# Patient Record
Sex: FEMALE | Race: WHITE
[De-identification: names, ages, dates, MRNs, and addresses within clinical notes are randomized per-mention and may not be internally consistent; named-entity substitution may affect disease eponyms.]

---

## 2019-01-30 ENCOUNTER — HOSPITAL ENCOUNTER (OUTPATIENT)
Dept: HOSPITAL 74 - JER | Age: 54
Setting detail: OBSERVATION
LOS: 2 days | Discharge: HOME | End: 2019-02-01
Attending: INTERNAL MEDICINE | Admitting: INTERNAL MEDICINE
Payer: COMMERCIAL

## 2019-01-30 VITALS — BODY MASS INDEX: 40.7 KG/M2

## 2019-01-30 DIAGNOSIS — R74.8: ICD-10-CM

## 2019-01-30 DIAGNOSIS — E66.01: ICD-10-CM

## 2019-01-30 DIAGNOSIS — R10.84: ICD-10-CM

## 2019-01-30 DIAGNOSIS — R10.12: ICD-10-CM

## 2019-01-30 DIAGNOSIS — I10: ICD-10-CM

## 2019-01-30 DIAGNOSIS — R10.13: ICD-10-CM

## 2019-01-30 DIAGNOSIS — K80.20: Primary | ICD-10-CM

## 2019-01-30 DIAGNOSIS — Z98.84: ICD-10-CM

## 2019-01-30 DIAGNOSIS — E78.5: ICD-10-CM

## 2019-01-30 LAB
ALBUMIN SERPL-MCNC: 3.8 G/DL (ref 3.4–5)
ALP SERPL-CCNC: 92 U/L (ref 45–117)
ALT SERPL-CCNC: 24 U/L (ref 13–61)
ANION GAP SERPL CALC-SCNC: 8 MMOL/L (ref 8–16)
APPEARANCE UR: (no result)
AST SERPL-CCNC: 24 U/L (ref 15–37)
BACTERIA #/AREA URNS HPF: (no result) /HPF
BASOPHILS # BLD: 1.1 % (ref 0–2)
BILIRUB SERPL-MCNC: 0.2 MG/DL (ref 0.2–1)
BILIRUB UR STRIP.AUTO-MCNC: NEGATIVE MG/DL
BUN SERPL-MCNC: 25 MG/DL (ref 7–18)
CALCIUM SERPL-MCNC: 8.9 MG/DL (ref 8.5–10.1)
CHLORIDE SERPL-SCNC: 106 MMOL/L (ref 98–107)
CHOLEST SERPL-MCNC: 242 MG/DL (ref 50–200)
CO2 SERPL-SCNC: 25 MMOL/L (ref 21–32)
COLOR UR: (no result)
CREAT SERPL-MCNC: 0.8 MG/DL (ref 0.55–1.3)
DEPRECATED RDW RBC AUTO: 13.9 % (ref 11.6–15.6)
EOSINOPHIL # BLD: 2.4 % (ref 0–4.5)
EPITH CASTS URNS QL MICRO: (no result) /HPF
GLUCOSE SERPL-MCNC: 80 MG/DL (ref 74–106)
HCT VFR BLD CALC: 33.8 % (ref 32.4–45.2)
HDLC SERPL-MCNC: 51 MG/DL (ref 40–60)
HGB BLD-MCNC: 11.3 GM/DL (ref 10.7–15.3)
KETONES UR QL STRIP: NEGATIVE
LEUKOCYTE ESTERASE UR QL STRIP.AUTO: (no result)
LIPASE SERPL-CCNC: 550 U/L (ref 73–393)
LYMPHOCYTES # BLD: 27.6 % (ref 8–40)
MCH RBC QN AUTO: 27 PG (ref 25.7–33.7)
MCHC RBC AUTO-ENTMCNC: 33.5 G/DL (ref 32–36)
MCV RBC: 80.4 FL (ref 80–96)
MONOCYTES # BLD AUTO: 7.4 % (ref 3.8–10.2)
MUCOUS THREADS URNS QL MICRO: (no result)
NEUTROPHILS # BLD: 61.5 % (ref 42.8–82.8)
NITRITE UR QL STRIP: NEGATIVE
PH UR: 6 [PH] (ref 5–8)
PLATELET # BLD AUTO: 292 K/MM3 (ref 134–434)
PMV BLD: 8.6 FL (ref 7.5–11.1)
POTASSIUM SERPLBLD-SCNC: 4.3 MMOL/L (ref 3.5–5.1)
PROT SERPL-MCNC: 7.9 G/DL (ref 6.4–8.2)
PROT UR QL STRIP: NEGATIVE
PROT UR QL STRIP: NEGATIVE
RBC # BLD AUTO: 4.2 M/MM3 (ref 3.6–5.2)
SODIUM SERPL-SCNC: 140 MMOL/L (ref 136–145)
SP GR UR: 1.03 (ref 1.01–1.03)
TRIGL SERPL-MCNC: 134 MG/DL (ref 0–150)
UROBILINOGEN UR STRIP-MCNC: NEGATIVE MG/DL (ref 0.2–1)
WBC # BLD AUTO: 9.4 K/MM3 (ref 4–10)

## 2019-01-30 PROCEDURE — 0DB68ZX EXCISION OF STOMACH, VIA NATURAL OR ARTIFICIAL OPENING ENDOSCOPIC, DIAGNOSTIC: ICD-10-PCS | Performed by: INTERNAL MEDICINE

## 2019-01-30 PROCEDURE — 3E0337Z INTRODUCTION OF ELECTROLYTIC AND WATER BALANCE SUBSTANCE INTO PERIPHERAL VEIN, PERCUTANEOUS APPROACH: ICD-10-PCS | Performed by: INTERNAL MEDICINE

## 2019-01-30 PROCEDURE — 3E013GC INTRODUCTION OF OTHER THERAPEUTIC SUBSTANCE INTO SUBCUTANEOUS TISSUE, PERCUTANEOUS APPROACH: ICD-10-PCS | Performed by: INTERNAL MEDICINE

## 2019-01-30 PROCEDURE — 3E033GC INTRODUCTION OF OTHER THERAPEUTIC SUBSTANCE INTO PERIPHERAL VEIN, PERCUTANEOUS APPROACH: ICD-10-PCS | Performed by: INTERNAL MEDICINE

## 2019-01-30 PROCEDURE — G0378 HOSPITAL OBSERVATION PER HR: HCPCS

## 2019-01-30 NOTE — PN
Teaching Attending Note


Name of Resident: Allyson Worthington





ATTENDING PHYSICIAN STATEMENT





I saw and evaluated the patient.


I reviewed the resident's note and discussed the case with the resident.


I agree with the resident's findings and plan as documented.








SUBJECTIVE:


Patient is a 53 year old woman with PMH of HTN, HLD, bariatric surgery about 15 

years ago who presents with sharp abdominal pain for 1 month. Pain radiates 

from the left costal margin to the left inguinal and suprapubic regions, 

relapsing and remitting with 8/10 severity at worst. No obvious exacerbating or 

alleviating factors. Recently developed nausea and dizzy spells. Reports recent 

use of meloxicam multiple times per week for several weeks for knee pain, and 

combining meloxicam with ibuprofen for particularly bad pain. She has been 

evaluated for these symptoms at urgent care, by her PMD, and by her GI. Reports 

undergoing US at urgent care but not knowing the result. Reports GI recommended 

CT scan but she has not followed up at this time. Endorses left-sided chest 

pain and SOB since onset of symptoms but is not currently experiencing these 

symptoms. No vomiting, diarrhea, chills, headache or dysuria. 





OBJECTIVE:


Alert


 Vital Signs











 Period  Temp  Pulse  Resp  BP Sys/Flynn  Pulse Ox


 


 Last 24 Hr  98.2 F  64  16  167/88  99








HEENT: No Jaundice, eye redness or discharge, PERRLA, EOMI. Normocephalic, 

atraumatic. External ears are normal and hearing is grossly intact. No nasal 

discharge.


Neck: Supple, nontender. No palpable adenopathy or thyromegaly. No JVD


Chest: Good effort. Clear to auscultation and percussion.


Heart: Regular. No S3, rub or murmur


Abdomen: Not distended, soft, tender left side of abdomen and suprapubic area; 

no HSM. No rebound or guarding.  Normoactive bowel sounds.


Ext: Peripheral pulses intact. No leg edema.


Skin: Warm and dry. No petechiae, rash or ecchymosis.


Neuro: Alert. Oriented x3. CN 2-12 grossly intact. Sensation grossly intact in 

all four extremities and DTR are symmetric.


 Current Medications











Generic Name Dose Route Start Last Admin





  Trade Name Freq  PRN Reason Stop Dose Admin


 


Lactated Ringer's  1,000 ml in 1,000 mls @ 100 mls/hr  01/30/19 21:00  01/30/19 

21:46





  Lactated Ringers Solution  IV   100 mls/hr





  ASDIR PROSPER   Administration





     





     





     





     








 Home Medications











 Medication  Instructions  Recorded


 


Nebivolol [Bystolic -] 5 mg PO DAILY 09/01/16








 Abnormal Lab Results











  01/30/19 01/30/19 01/30/19





  16:38 17:09 18:44


 


BUN  25 H  


 


Cholesterol    242 H


 


Total LDL Cholesterol    164 H


 


Lipase  550 H  


 


Ur Leukocyte Esterase   1+ H 








ASSESSMENT AND PLAN:


1. Abdominal pain - May be due to gall stones noted on CT scan. Will repeat UA 

to rule out UTI. Trend lipase and get MRCP after consultation with GI. Continue 

IV fluids, keep her NPO and do pain control PRN. Surgery consulted. Needs 

dietary and intensified drug treatment for hyperlipidemia.





2. Obesity - Will provide patient all the necessary assistance, counseling and 

positive reinforcement to facilitate weight loss. Consult dietician.





3. Uncontrolled Hypertension - Will restart her home antihypertensive drugs and 

also craft a better regimen for her to ultimately attain normotension.  

Nonpharmacologic measures to control hypertension like weight loss, salt 

restriction and exercise discussed.





4. DVT prophylaxis - Lovenox 40 mg SQ q 24 hours.





5. Advance directives - Full code

## 2019-01-30 NOTE — HP
CHIEF COMPLAINT: epigastric abdominal pain





PCP: Dr. Cowart





HISTORY OF PRESENT ILLNESS:


52 y/o female with PMH of HTN, HLD, presents with a one month history of 

epigastric/left sided abdominal pain. She states that the pain stated on new 

years tfif, it was no associated with any nausea/vomiting/diarrhea nor was it 

worse after certain foods. She saw her gastroenterologist Dr. Jackson who 

started her on Hyoscyamine .375 daily and told her to let him know how she was 

feeling. She was still having pain so she went to an urgent care who di an 

ultrasound which showed gallstones and they did labs which showed a lipase of 

550 and she began feeling nauseous today so she came in.  of note, she did have 

a stomach virus 2 weeks ago whihc lasted 2 weeks. she denies any recent travel 

nor has any sick contacts at home.





ER course was notable for:


(1) vital wnl


(2) cbc/cmp wnl; lipase 550


(3) ab/pelvis CT showing cholelithiasis no cholecystitis or pancreatitis





Recent Travel:


denies


PAST MEDICAL HISTORY:


see above


PAST SURGICAL HISTORY:


gastric band surgery (15 years ago) 


Social History:


Smoking:former smoker quit 27 years ago


Alcohol:denies


Drugs: denies





Family History: HTN on both sides


Allergies





No Known Allergies Allergy (Verified 09/01/16 11:05)


 








HOME MEDICATIONS:


 Home Medications











 Medication  Instructions  Recorded


 


Nebivolol [Bystolic -] 5 mg PO DAILY 09/01/16


 


Pravastatin Sodium [Pravachol (Nf)] 40 mg PO HS 01/30/19








REVIEW OF SYSTEMS


CONSTITUTIONAL: 


Absent:  fever, chills, diaphoresis, generalized weakness, malaise, loss of 

appetite, weight change


HEENT: 


Absent:  rhinorrhea, nasal congestion, throat pain, throat swelling, difficulty 

swallowing, mouth swelling, ear pain, eye pain, visual changes


CARDIOVASCULAR: 


Absent: chest pain, syncope, palpitations, irregular heart rate, lightheadedness

, peripheral edema


RESPIRATORY: 


Absent: cough, shortness of breath, dyspnea with exertion, orthopnea, wheezing, 

stridor, hemoptysis


GASTROINTESTINAL:


Present:abdominal pain, nausea,  Absent: , diarrhea, constipation, melena, 

hematochezia


GENITOURINARY: 


Absent: dysuria, frequency, urgency, hesitancy, hematuria, flank pain, genital 

pain


MUSCULOSKELETAL: 


Absent: myalgia, arthralgia, joint swelling, back pain, neck pain


SKIN: 


Absent: rash, itching, pallor


HEMATOLOGIC/IMMUNOLOGIC: 


Absent: easy bleeding, easy bruising, lymphadenopathy, frequent infections


ENDOCRINE:


Absent: unexplained weight gain, unexplained weight loss, heat intolerance, 

cold intolerance


NEUROLOGIC: 


Absent: headache, focal weakness or paresthesias, dizziness, unsteady gait, 

seizure, mental status changes, bladder or bowel incontinence


PSYCHIATRIC: 


Absent: anxiety, depression, suicidal or homicidal ideation, hallucinations.








PHYSICAL EXAMINATION


 Vital Signs - 24 hr











  01/30/19





  16:03


 


Temperature 98.2 F


 


Pulse Rate 64


 


Respiratory 16





Rate 


 


Blood Pressure 167/88


 


O2 Sat by Pulse 99





Oximetry (%) 











GENERAL: Awake, alert, and fully oriented, in no acute distress


EYES: EOMI; PEERLA; no scleral icterus 


NECK:no JVD, no lymphadenopathy.


LUNGS: CTA B/L; no rales, rhonchi or wheezing


HEART: Regular rate and rhythm, normal S1 and S2 without murmur, rub or gallop.


ABDOMEN: Soft, +left-sided/suprapubic tenderness; -murphys sign; + BS in all 4 

quadrants 


MUSCULOSKELETAL: Normal range of motion at all joints. No bony deformities or 

tenderness. No CVA tenderness.


EXTREMITIES; warm; well-perfused, no clubbing/cyanosis or edema. 


NEUROLOGICAL:  Cranial nerves II-XII intact. Normal speech. Normal gait.


PSYCHIATRIC: Cooperative. Good eye contact. Appropriate mood and affect.


SKIN: Warm, dry, normal turgor, no rashes or lesions noted, normal capillary 

refill. 


 Laboratory Results - last 24 hr











  01/30/19 01/30/19 01/30/19





  16:37 16:38 17:09


 


WBC  9.4  


 


RBC  4.20  


 


Hgb  11.3  


 


Hct  33.8  


 


MCV  80.4  


 


MCH  27.0  


 


MCHC  33.5  


 


RDW  13.9  


 


Plt Count  292  


 


MPV  8.6  


 


Absolute Neuts (auto)  5.8  


 


Neutrophils %  61.5  


 


Lymphocytes %  27.6  


 


Monocytes %  7.4  


 


Eosinophils %  2.4  


 


Basophils %  1.1  D  


 


Nucleated RBC %  0  


 


Sodium   140 


 


Potassium   4.3 


 


Chloride   106 


 


Carbon Dioxide   25 


 


Anion Gap   8 


 


BUN   25 H 


 


Creatinine   0.8 


 


Creat Clearance w eGFR   > 60 


 


Random Glucose   80 


 


Calcium   8.9 


 


Total Bilirubin   0.2 


 


AST   24 


 


ALT   24 


 


Alkaline Phosphatase   92 


 


Total Protein   7.9 


 


Albumin   3.8 


 


Triglycerides   


 


Cholesterol   


 


Total LDL Cholesterol   


 


HDL Cholesterol   


 


Lipase   550 H 


 


Urine Color    Dkyellow


 


Urine Appearance    Slcloudy


 


Urine pH    6.0


 


Ur Specific Gravity    1.027


 


Urine Protein    Negative


 


Urine Glucose (UA)    Negative


 


Urine Ketones    Negative


 


Urine Blood    Negative


 


Urine Nitrite    Negative


 


Urine Bilirubin    Negative


 


Urine Urobilinogen    Negative


 


Ur Leukocyte Esterase    1+ H


 


Urine WBC (Auto)    9


 


Urine RBC (Auto)    4


 


Ur Epithelial Cells    Moderate


 


Urine Bacteria    Rare


 


Urine Mucus    Rare














  01/30/19





  18:44


 


WBC 


 


RBC 


 


Hgb 


 


Hct 


 


MCV 


 


MCH 


 


MCHC 


 


RDW 


 


Plt Count 


 


MPV 


 


Absolute Neuts (auto) 


 


Neutrophils % 


 


Lymphocytes % 


 


Monocytes % 


 


Eosinophils % 


 


Basophils % 


 


Nucleated RBC % 


 


Sodium 


 


Potassium 


 


Chloride 


 


Carbon Dioxide 


 


Anion Gap 


 


BUN 


 


Creatinine 


 


Creat Clearance w eGFR 


 


Random Glucose 


 


Calcium 


 


Total Bilirubin 


 


AST 


 


ALT 


 


Alkaline Phosphatase 


 


Total Protein 


 


Albumin 


 


Triglycerides  134


 


Cholesterol  242 H


 


Total LDL Cholesterol  164 H


 


HDL Cholesterol  51


 


Lipase 


 


Urine Color 


 


Urine Appearance 


 


Urine pH 


 


Ur Specific Gravity 


 


Urine Protein 


 


Urine Glucose (UA) 


 


Urine Ketones 


 


Urine Blood 


 


Urine Nitrite 


 


Urine Bilirubin 


 


Urine Urobilinogen 


 


Ur Leukocyte Esterase 


 


Urine WBC (Auto) 


 


Urine RBC (Auto) 


 


Ur Epithelial Cells 


 


Urine Bacteria 


 


Urine Mucus 











ASSESSMENT/PLAN:


52 y/o female with PMH of HTN, HLD, gastric band surgery 15 years ago who 

presents with a 1 month history of worsening epigastric/suprapubic/abdominal 

pain found to have cholelithiasis on ultrasound





# Abdominal pain


CT showing no evidence of pancreatitis or acute cholecystitis - showing 

evidence of cholelithiassis 


-trend lipase; repeat in AM


-Dr Salazar consulted; IVF, NPO will see patient in AM


-MRCP ordered


-GI consulted


-given patients suprapubic tenderness; repeat U/A and send Cx





#HTN


-c/w bystolic 5mg


-may consider adding another regimen


- patients on importance of diet/exercise (low sodium etc)





#HLD


-c/w pravastatin 40mg





F/E/N


LR @100mls/hr


monitor electrolytes 


NPO for now 





Problem List





- Problem


(1) Abdominal pain


Code(s): R10.9 - UNSPECIFIED ABDOMINAL PAIN   


Qualifiers: 


   Abdominal location: generalized   Qualified Code(s): R10.84 - Generalized 

abdominal pain   





(2) Elevated lipase


Code(s): R74.8 - ABNORMAL LEVELS OF OTHER SERUM ENZYMES   





(3) Symptomatic cholelithiasis


Code(s): K80.20 - CALCULUS OF GALLBLADDER W/O CHOLECYSTITIS W/O OBSTRUCTION   





Visit type





- Emergency Visit


Emergency Visit: Yes


ED Registration Date: 01/30/19


Care time: The patient presented to the Emergency Department on the above date 

and was hospitalized for further evaluation of their emergent condition.





- New Patient


This patient is new to me today: Yes


Date on this admission: 01/30/19





- Critical Care


Critical Care patient: No

## 2019-01-30 NOTE — PDOC
History of Present Illness





- General


Chief Complaint: Pain, Acute


Stated Complaint: ABD PAIN / NAUSEA


Time Seen by Provider: 01/30/19 16:02





- History of Present Illness


Initial Comments: 





01/30/19 17:50


53 year old woman with a history of HTN, Thalassemia (type unknown) who 

presents with 








Past History





- Past Medical History


Allergies/Adverse Reactions: 


 Allergies











Allergy/AdvReac Type Severity Reaction Status Date / Time


 


No Known Allergies Allergy   Verified 09/01/16 11:05











Home Medications: 


Ambulatory Orders





Nebivolol [Bystolic -] 5 mg PO DAILY 09/01/16 








COPD: No


HTN: Yes


Hypercholesterolemia: Yes





- Surgical History


Gastric Stapling: Yes (gastric banding)





- Immunization History


Immunization Up to Date: No





- Suicide/Smoking/Psychosocial Hx


Smoking History: Never smoked


Have you smoked in the past 12 months: No


Information on smoking cessation initiated: No


Hx Alcohol Use: No


Drug/Substance Use Hx: No


Substance Use Type: None





*Physical Exam





- Vital Signs


 Last Vital Signs











Temp Pulse Resp BP Pulse Ox


 


 98.2 F   64   16   167/88   99 


 


 01/30/19 16:03  01/30/19 16:03  01/30/19 16:03  01/30/19 16:03  01/30/19 16:03














Moderate Sedation





- Procedure Monitoring


Vital Signs: 


Procedure Monitoring Vital Signs











Temperature  98.2 F   01/30/19 16:03


 


Pulse Rate  64   01/30/19 16:03


 


Respiratory Rate  16   01/30/19 16:03


 


Blood Pressure  167/88   01/30/19 16:03


 


O2 Sat by Pulse Oximetry (%)  99   01/30/19 16:03











ED Treatment Course





- LABORATORY


CBC & Chemistry Diagram: 


 01/30/19 16:37





 01/30/19 16:38





- ADDITIONAL ORDERS


Additional order review: 


 Laboratory  Results











  01/30/19 01/30/19





  17:09 16:38


 


Sodium   140


 


Potassium   4.3


 


Chloride   106


 


Carbon Dioxide   25


 


Anion Gap   8


 


BUN   25 H


 


Creatinine   0.8


 


Creat Clearance w eGFR   > 60


 


Random Glucose   80


 


Calcium   8.9


 


Total Bilirubin   0.2


 


AST   24


 


ALT   24


 


Alkaline Phosphatase   92


 


Total Protein   7.9


 


Albumin   3.8


 


Lipase   550 H


 


Urine Color  Dkyellow 


 


Urine Appearance  Slcloudy 


 


Urine pH  6.0 


 


Ur Specific Gravity  1.027 


 


Urine Protein  Negative 


 


Urine Glucose (UA)  Negative 


 


Urine Ketones  Negative 


 


Urine Blood  Negative 


 


Urine Nitrite  Negative 


 


Urine Bilirubin  Negative 


 


Urine Urobilinogen  Negative 


 


Ur Leukocyte Esterase  1+ H 


 


Urine WBC (Auto)  9 


 


Urine RBC (Auto)  4 


 


Ur Epithelial Cells  Moderate 


 


Urine Bacteria  Rare 


 


Urine Mucus  Rare 








 











  01/30/19





  16:37


 


RBC  4.20


 


MCV  80.4


 


MCHC  33.5


 


RDW  13.9


 


MPV  8.6


 


Neutrophils %  61.5


 


Lymphocytes %  27.6


 


Monocytes %  7.4


 


Eosinophils %  2.4


 


Basophils %  1.1  D














*DC/Admit/Observation/Transfer


Diagnosis at time of Disposition: 


Abdominal pain


Qualifiers:


 Abdominal location: generalized Qualified Code(s): R10.84 - Generalized 

abdominal pain








- Discharge Dispostion


Condition at time of disposition: Stable





- Referrals


Referrals: 


Lexis Cowart [Primary Care Provider] - 





- Patient Instructions





- Post Discharge Activity

## 2019-01-30 NOTE — PDOC
History of Present Illness





- General


Chief Complaint: Pain, Acute


Stated Complaint: ABD PAIN / NAUSEA


Time Seen by Provider: 01/30/19 16:02


History Source: Patient


Exam Limitations: No Limitations





- History of Present Illness


Initial Comments: 


Patient is a 54 y/o F w/ PMHx HTN, HLD, bariatric surgery ~15y/a, p/w 1 month 

sharp abdominal pain radiating from the left costal margin to the left inguinal 

and suprapubic regions, relapsing and remitting with 8/10 severity at worst, no 

exacerbating or alleviating factors. Additionally has recently developed 

nausea. Dizzy spells starting today prompted ED visit. Reports recent use of 

meloxicam multiple times per week for several weeks for knee pain, and 

combining meloxicam with ibuprofen for particularly bad pain. She has been 

evaluated for these symptoms at urgent care, by her PMD, and by her GI. Reports 

undergoing US at urgent care but not knowing the result. Reports GI recommended 

CT scan but she has not followed up at this time. Endorses left-sided chest 

pain and SOB since onset of symptoms but is not currently experiencing these 

symptoms. No vomiting, no diarrhea, no constipation, no dysuria. NO HA, f/c. 


01/30/19 18:11





01/30/19 18:27








Past History





- Travel


Traveled outside of the country in the last 30 days: No


Close contact w/someone who was outside of country & ill: No





- Past Medical History


Allergies/Adverse Reactions: 


 Allergies











Allergy/AdvReac Type Severity Reaction Status Date / Time


 


No Known Allergies Allergy   Verified 09/01/16 11:05











Home Medications: 


Ambulatory Orders





Nebivolol [Bystolic -] 5 mg PO DAILY 09/01/16 








COPD: No


HTN: Yes


Hypercholesterolemia: Yes





- Surgical History


Gastric Stapling: Yes (gastric banding)





- Immunization History


Immunization Up to Date: No





- Suicide/Smoking/Psychosocial Hx


Smoking History: Never smoked


Have you smoked in the past 12 months: No


Information on smoking cessation initiated: No


Hx Alcohol Use: No


Drug/Substance Use Hx: No


Substance Use Type: None





**Review of Systems





- Review of Systems


Comments:: 


As per HPI.


01/30/19 18:28








*Physical Exam





- Vital Signs


 Last Vital Signs











Temp Pulse Resp BP Pulse Ox


 


 98.2 F   64   16   167/88   99 


 


 01/30/19 16:03  01/30/19 16:03  01/30/19 16:03  01/30/19 16:03  01/30/19 16:03














- Physical Exam


Comments: 


Gen: A&Ox3, NAD


HEENT: NC/AT, PERRLA, EOMI, MMM


Neck: supple, no LAD, no JVD


CV: RRR no m/r/g


Resp: CTA b/l


Abd: +bs, soft, tenderness throughout left side of the abdomen extending 

centrally to suprapubic region; right abdomen is non-tender but deep palpation 

of the right produces pain to the left of midline


Ext: 2+ pulses, wwp, no edema


Neuro: CNs, motor, sensory systems w/o focal deficit


Psych: normal mood, normal affect


Skin: warm, dry, normal turgor


01/30/19 18:29








Moderate Sedation





- Procedure Monitoring


Vital Signs: 


Procedure Monitoring Vital Signs











Temperature  98.2 F   01/30/19 16:03


 


Pulse Rate  64   01/30/19 16:03


 


Respiratory Rate  16   01/30/19 16:03


 


Blood Pressure  167/88   01/30/19 16:03


 


O2 Sat by Pulse Oximetry (%)  99   01/30/19 16:03











ED Treatment Course





- LABORATORY


CBC & Chemistry Diagram: 


 01/30/19 16:37





 01/30/19 16:38





- ADDITIONAL ORDERS


Additional order review: 


 Laboratory  Results











  01/30/19 01/30/19





  17:09 16:38


 


Sodium   140


 


Potassium   4.3


 


Chloride   106


 


Carbon Dioxide   25


 


Anion Gap   8


 


BUN   25 H


 


Creatinine   0.8


 


Creat Clearance w eGFR   > 60


 


Random Glucose   80


 


Calcium   8.9


 


Total Bilirubin   0.2


 


AST   24


 


ALT   24


 


Alkaline Phosphatase   92


 


Total Protein   7.9


 


Albumin   3.8


 


Lipase   550 H


 


Urine Color  Dkyellow 


 


Urine Appearance  Slcloudy 


 


Urine pH  6.0 


 


Ur Specific Gravity  1.027 


 


Urine Protein  Negative 


 


Urine Glucose (UA)  Negative 


 


Urine Ketones  Negative 


 


Urine Blood  Negative 


 


Urine Nitrite  Negative 


 


Urine Bilirubin  Negative 


 


Urine Urobilinogen  Negative 


 


Ur Leukocyte Esterase  1+ H 


 


Urine WBC (Auto)  9 


 


Urine RBC (Auto)  4 


 


Ur Epithelial Cells  Moderate 


 


Urine Bacteria  Rare 


 


Urine Mucus  Rare 








 











  01/30/19





  16:37


 


RBC  4.20


 


MCV  80.4


 


MCHC  33.5


 


RDW  13.9


 


MPV  8.6


 


Neutrophils %  61.5


 


Lymphocytes %  27.6


 


Monocytes %  7.4


 


Eosinophils %  2.4


 


Basophils %  1.1  D














Medical Decision Making





- Medical Decision Making


Initial labs notable for lipase elevated but <3x u/l/n. Pancreatitis 2/2 

gallstones, HLD, and meloxicam use (rare side effect) is possible. Basic GI 

labs resulted at time of encounter. Ordering CT a/p w/ contrast and lipid 

panel. Giving Zofran.


01/30/19 18:47





CT a/p identifies s/p gastric banding, demonstrates cholelithiasis, no evidence 

of pancreatitis or other acute abdominopelvic pathology.


01/30/19 20:06





D/w Dr. Salazar. Most likely diagnosis is symptomatic cholelithiasis. Will make NPO

, maintain IVF, repeat AM labs including lipase. If lipase increases, Pt will 

need MRCP. Otherwise will be considered for CCY. To be admitted for med/surg 

observation.


01/30/19 20:57





D/w hospitalist. Will admit.


01/30/19 21:50








*DC/Admit/Observation/Transfer


Diagnosis at time of Disposition: 


Abdominal pain


Qualifiers:


 Abdominal location: generalized Qualified Code(s): R10.84 - Generalized 

abdominal pain








- Discharge Dispostion


Condition at time of disposition: Stable





- Referrals


Referrals: 


Lexis Cowart [Primary Care Provider] - 





- Patient Instructions





- Post Discharge Activity

## 2019-01-30 NOTE — PDOC
Rapid Medical Evaluation


Chief Complaint: Pain, Acute


Time Seen by Provider: 01/30/19 16:02


Medical Evaluation: 


 Allergies











Allergy/AdvReac Type Severity Reaction Status Date / Time


 


No Known Allergies Allergy   Verified 09/01/16 11:05











01/30/19 16:02


I have performed a brief in person evaluation of this patient.





The patient presents with the CC of: abd pain





HPI: Pt is a 52 YO female who states she has had abdominal pain x 1 month.  

Gastric band procedure in the past.





PE:


Skin: Clear


Lungs: Clear


Heart: RRR


Abd: non tender


MS: Moves all extremities without difficulty


Neuro: Alert and oriented


Psych: Appropriate affect





I have ordered the following: abd protocol





Pt will proceed to the main ED for further evaluation.





**Discharge Disposition





- Diagnosis


Abdominal pain


Qualifiers:


 Abdominal location: generalized Qualified Code(s): R10.84 - Generalized 

abdominal pain








- Referrals





- Patient Instructions





- Post Discharge Activity

## 2019-01-30 NOTE — PDOC
Attending Attestation





- HPI


HPI: 





01/30/19 19:14


 


The patient is a 53 year old female with a significant PMH of HTN, HLD, and 

gastric band who presents to the emergency department with abdominal pain for 

the past month. Patient denies any exacerbating or alleviating factors. Patient 

admits to intermittent nausea but no vomiting. She also admits to decreased 

appetite and irregular bowel movements. Patient has been on meloxicam for the 

past three months. Patient had an ultrasound done outpatient recently which 

showed gallstones. 





The patient denies chest pain, shortness of breath, headache and dizziness.


Denies fever, chills, vomit, diarrhea and constipation.


Denies dysuria, frequency, urgency and hematuria.





Allergies: NKA


Past surgical history: None reported. 


Social history: No reported alcohol, drug or cigarette use. 











- Physicial Exam


PE: 





01/30/19 19:35





ADULT PHYSICAL EXAM


Constitutional: Awake, alert, oriented. No acute distress.


Cardiovascular: Regular rate. Regular rhythm. S1, S2 regular. Distal pulses are 

2+ and symmetric. 


Pulmonary/Chest: No evidence of respiratory distress. Clear to auscultation 

bilaterally No wheezing, rales or rhonchi.


Abdominal: Soft and non-distended. (+) Mild left upper quadrant and epigastric 

tenderness.


Back: No CVA tenderness.


Musculoskeletal: No edema. No cyanosis. No clubbing. Full range of motion in 

all extremities. 


Skin: Skin is warm and dry. No petechiae. No purpura. 


Neurological: Alert and oriented to person, place, and time. Cranial nerves II-

XII are grossly intact. 


Psychiatric: Good eye contact. Normal interaction, affect and behavior.











<Yue Fraser - Last Filed: 01/30/19 19:34>





- Resident


Resident Name: Tay Martins





- ED Attending Attestation


I have performed the following: I have examined & evaluated the patient, The 

case was reviewed & discussed with the resident, I agree w/resident's findings 

& plan, Exceptions are as noted





- Medical Decision Making





01/30/19 17:42








I, Dr. Tiffanie Frost, DO, attest that this document has been prepared under 

my direction and personally reviewed by me in its entirety.   I further attest, 

that it accurately reflects all work, treatment, procedures and medical decision

-making performed by me.  


01/30/19 19:03


a/p: 52yo female with a 1 month hx of epigastric pain


-assoc with nausea


-outpt ultrasound showed gallstones


-labs sent from [facility] concerning for elevated lipase


-will send for CT abd/pelvis to eval for pancreatitis and gallstone 

pancreaititis


-may need MRCP if +gallstone pancreatitis


-will keep npo


-ivf hydration running


-zofran for nausea


-will monitor and reassess


01/30/19 20:24


gallstones on ct





01/30/19 20:54


case discussed with Dr. Salazar-recommends IVF hydration, npo, trend labs 

including lipase


will see patient in the [place]


resident discussed the findings with the patient who is willing to stay for 

further eval


01/30/19 21:50


case discussed with [facility] who accepts pt to obs





<Tiffanie Frost - Last Filed: 01/30/19 21:50>





*DC/Admit/Observation/Transfer





<Yue Fraser - Last Filed: 01/30/19 19:34>





- Discharge Dispostion


Decision to Admit order: Yes





<Tiffanie Frost - Last Filed: 01/30/19 21:50>


Diagnosis at time of Disposition: 


 Symptomatic cholelithiasis, Elevated lipase





Abdominal pain


Qualifiers:


 Abdominal location: generalized Qualified Code(s): R10.84 - Generalized 

abdominal pain








- Discharge Dispostion


Condition at time of disposition: Fair





- Referrals


Referrals: 


Lexis Cowart [Primary Care Provider] - 





- Patient Instructions





- Post Discharge Activity

## 2019-01-31 LAB
ALBUMIN SERPL-MCNC: 3.2 G/DL (ref 3.4–5)
ALP SERPL-CCNC: 76 U/L (ref 45–117)
ALT SERPL-CCNC: 22 U/L (ref 13–61)
ANION GAP SERPL CALC-SCNC: 7 MMOL/L (ref 8–16)
APPEARANCE UR: CLEAR
AST SERPL-CCNC: 16 U/L (ref 15–37)
BASOPHILS # BLD: 0.3 % (ref 0–2)
BILIRUB SERPL-MCNC: 0.5 MG/DL (ref 0.2–1)
BILIRUB UR STRIP.AUTO-MCNC: NEGATIVE MG/DL
BUN SERPL-MCNC: 13 MG/DL (ref 7–18)
CALCIUM SERPL-MCNC: 8.4 MG/DL (ref 8.5–10.1)
CHLORIDE SERPL-SCNC: 110 MMOL/L (ref 98–107)
CO2 SERPL-SCNC: 26 MMOL/L (ref 21–32)
COLOR UR: (no result)
CREAT SERPL-MCNC: 0.7 MG/DL (ref 0.55–1.3)
DEPRECATED RDW RBC AUTO: 13.8 % (ref 11.6–15.6)
EOSINOPHIL # BLD: 3.9 % (ref 0–4.5)
GLUCOSE SERPL-MCNC: 77 MG/DL (ref 74–106)
HCT VFR BLD CALC: 31.3 % (ref 32.4–45.2)
HGB BLD-MCNC: 10.4 GM/DL (ref 10.7–15.3)
KETONES UR QL STRIP: NEGATIVE
LEUKOCYTE ESTERASE UR QL STRIP.AUTO: NEGATIVE
LIPASE SERPL-CCNC: 253 U/L (ref 73–393)
LYMPHOCYTES # BLD: 25.3 % (ref 8–40)
MAGNESIUM SERPL-MCNC: 2.2 MG/DL (ref 1.8–2.4)
MCH RBC QN AUTO: 26.4 PG (ref 25.7–33.7)
MCHC RBC AUTO-ENTMCNC: 33.2 G/DL (ref 32–36)
MCV RBC: 79.5 FL (ref 80–96)
MONOCYTES # BLD AUTO: 6.6 % (ref 3.8–10.2)
NEUTROPHILS # BLD: 63.9 % (ref 42.8–82.8)
NITRITE UR QL STRIP: NEGATIVE
PH UR: 7 [PH] (ref 5–8)
PHOSPHATE SERPL-MCNC: 3.5 MG/DL (ref 2.5–4.9)
PLATELET # BLD AUTO: 251 K/MM3 (ref 134–434)
PMV BLD: 8.5 FL (ref 7.5–11.1)
POTASSIUM SERPLBLD-SCNC: 4.2 MMOL/L (ref 3.5–5.1)
PROT SERPL-MCNC: 6.7 G/DL (ref 6.4–8.2)
PROT UR QL STRIP: NEGATIVE
PROT UR QL STRIP: NEGATIVE
RBC # BLD AUTO: 3.93 M/MM3 (ref 3.6–5.2)
SODIUM SERPL-SCNC: 143 MMOL/L (ref 136–145)
SP GR UR: 1.01 (ref 1.01–1.03)
UROBILINOGEN UR STRIP-MCNC: NEGATIVE MG/DL (ref 0.2–1)
WBC # BLD AUTO: 7.6 K/MM3 (ref 4–10)

## 2019-01-31 RX ADMIN — NEBIVOLOL HYDROCHLORIDE SCH MG: 5 TABLET ORAL at 15:34

## 2019-01-31 RX ADMIN — ENOXAPARIN SODIUM SCH: 40 INJECTION SUBCUTANEOUS at 10:34

## 2019-01-31 RX ADMIN — ACETAMINOPHEN PRN MG: 325 TABLET ORAL at 15:04

## 2019-01-31 RX ADMIN — SODIUM CHLORIDE, POTASSIUM CHLORIDE, SODIUM LACTATE AND CALCIUM CHLORIDE SCH MLS/HR: 600; 310; 30; 20 INJECTION, SOLUTION INTRAVENOUS at 18:56

## 2019-01-31 RX ADMIN — ACETAMINOPHEN PRN MG: 325 TABLET ORAL at 03:24

## 2019-01-31 RX ADMIN — SODIUM CHLORIDE, POTASSIUM CHLORIDE, SODIUM LACTATE AND CALCIUM CHLORIDE SCH MLS/HR: 600; 310; 30; 20 INJECTION, SOLUTION INTRAVENOUS at 22:54

## 2019-01-31 NOTE — PN
Teaching Attending Note


Name of Resident: Neeta Maria





ATTENDING PHYSICIAN STATEMENT





I saw and evaluated the patient.


I reviewed the resident's note and discussed the case with the resident.


I agree with the resident's findings and plan as documented.








SUBJECTIVE:


Patient is comfortable with no acute distress. 





OBJECTIVE:





 Vital Signs











Temperature  98.5 F   01/31/19 17:41


 


Pulse Rate  65   01/31/19 17:41


 


Respiratory Rate  18   01/31/19 17:41


 


Blood Pressure  142/69   01/31/19 17:41


 


O2 Sat by Pulse Oximetry (%)  100   01/31/19 15:38








GENERAL: The patient is awake, alert, and fully oriented, in no acute distress.


HEAD: Normal with no signs of trauma.


EYES: PERRLA, EOMI, sclera anicteric, conjunctiva clear. 


ENT: Ears normal, nares patent, oropharynx clear without exudates, moist mucous 

membranes.


NECK: Trachea midline, full range of motion, supple. 


LUNGS: Breath sounds equal, clear to auscultation bilaterally.


HEART: Regular rate and rhythm, S1, S2 without murmur, rub or gallop.


ABDOMEN: Soft, +epigastric/LUQ tenderness, nondistended, normoactive bowel 

sounds, no masses appreciated.


EXTREMITIES: 2+ pulses, warm, well-perfused, no edema. 


NEUROLOGICAL: Cranial nerves II through XII grossly intact. Normal speech, gait 

not observed.


PSYCH: Normal mood, normal affect.


SKIN: Warm, dry, normal turgor, no rashes or lesions noted


 CBCD











WBC  7.6 K/mm3 (4.0-10.0)   01/31/19  06:30    


 


RBC  3.93 M/mm3 (3.60-5.2)   01/31/19  06:30    


 


Hgb  10.4 GM/dL (10.7-15.3)  L  01/31/19  06:30    


 


Hct  31.3 % (32.4-45.2)  L  01/31/19  06:30    


 


MCV  79.5 fl (80-96)  L  01/31/19  06:30    


 


MCHC  33.2 g/dl (32.0-36.0)   01/31/19  06:30    


 


RDW  13.8 % (11.6-15.6)   01/31/19  06:30    


 


Plt Count  251 K/MM3 (134-434)   01/31/19  06:30    


 


MPV  8.5 fl (7.5-11.1)   01/31/19  06:30    








 CMP











Sodium  143 mmol/L (136-145)   01/31/19  06:30    


 


Potassium  4.2 mmol/L (3.5-5.1)   01/31/19  06:30    


 


Chloride  110 mmol/L ()  H  01/31/19  06:30    


 


Carbon Dioxide  26 mmol/L (21-32)   01/31/19  06:30    


 


Anion Gap  7 MMOL/L (8-16)  L  01/31/19  06:30    


 


BUN  13 mg/dL (7-18)   01/31/19  06:30    


 


Creatinine  0.7 mg/dL (0.55-1.3)   01/31/19  06:30    


 


Creat Clearance w eGFR  > 60  (>60)   01/31/19  06:30    


 


Random Glucose  77 mg/dL ()   01/31/19  06:30    


 


Calcium  8.4 mg/dL (8.5-10.1)  L  01/31/19  06:30    


 


Total Bilirubin  0.5 mg/dL (0.2-1)   01/31/19  06:30    


 


AST  16 U/L (15-37)   01/31/19  06:30    


 


ALT  22 U/L (13-61)   01/31/19  06:30    


 


Alkaline Phosphatase  76 U/L ()   01/31/19  06:30    


 


Total Protein  6.7 g/dl (6.4-8.2)   01/31/19  06:30    


 


Albumin  3.2 g/dl (3.4-5.0)  L  01/31/19  06:30    








 Current Medications











Generic Name Dose Route Start Last Admin





  Trade Name Freq  PRN Reason Stop Dose Admin


 


Acetaminophen  650 mg  01/31/19 15:49  





  Tylenol -  PO   





  Q6H PRN   





  PAIN OR FEVER   





     





     





     


 


Atorvastatin Calcium  10 mg  01/31/19 22:00  





  Lipitor -  PO   





  HS PROSPER   





     





     





     





     


 


Enoxaparin Sodium  40 mg  01/31/19 10:00  01/31/19 10:34





  Lovenox -  SQ   Not Given





  DAILY PROSPER   





     





     





     





     


 


Lactated Ringer's  1,000 ml in 1,000 mls @ 100 mls/hr  01/31/19 16:38  





  Lactated Ringers Solution  IV   





  ASDIR PROSPER   





     





     





     





     


 


Nebivolol  5 mg  01/31/19 10:00  01/31/19 15:34





  Bystolic -  PO   5 mg





  DAILY [facility]   Administration





     





     





     





     








 Home Medications











 Medication  Instructions  Recorded


 


Nebivolol [Bystolic -] 5 mg PO DAILY 09/01/16


 


Pravastatin Sodium [Pravachol (Nf)] 40 mg PO HS 01/30/19








CT AP: Cholelithiasis. No pancreatitis or acute pathology within abdomen/pelvis.





ASSESSMENT AND PLAN:


Patient is a 53 year old female with PMHx of HTN and HLD, presented with 

intermittent episodes of epigastric/LUQ pain x 1 month. 





#Abdominal pain: s/p EGD done for further intraluminal evaluation, Esophagus, 

Stomach and duodenal mucosa appeared normal. Biopsy pending.


 GI (Dr. Willis) consulted. Recommendations appreciated. Avoid NSAIDs for 3 

days. 


 Surgery (Dr. Salazar) consulted. Lipase trended down 550 --> 253.





#Hypertension continue  Bystolic 5mg daily. monitor BP





#Hyperlipidemia: continue  Pravastatin 40mg daily.





DVt px: Lovenox 40mg sq

## 2019-01-31 NOTE — PN
Progress Note (short form)





- Note


Progress Note: 





EGD complete. Report placed in procedural section of physical chart and will be 

scanned into [employer]. 








Problem List





- Problems


(1) Abdominal pain


Code(s): R10.9 - UNSPECIFIED ABDOMINAL PAIN   


Qualifiers: 


   Abdominal location: generalized   Qualified Code(s): R10.84 - Generalized 

abdominal pain

## 2019-01-31 NOTE — EKG
Test Reason : 

Blood Pressure : ***/*** mmHG

Vent. Rate : 070 BPM     Atrial Rate : 070 BPM

   P-R Int : 158 ms          QRS Dur : 088 ms

    QT Int : 376 ms       P-R-T Axes : 049 059 034 degrees

   QTc Int : 406 ms

 

NORMAL SINUS RHYTHM

NORMAL ECG

WHEN COMPARED WITH ECG OF 09-JUL-2018 16:32,

NO SIGNIFICANT CHANGE WAS FOUND

Confirmed by JAYDA SHEETS MD ([id number]) on 1/31/2019 2:15:53 PM

 

Referred By:             Confirmed By:JAYDA SHEETS MD

## 2019-01-31 NOTE — PN
Physical Exam: 


SUBJECTIVE: Patient seen and examined at bedside this morning. 


Patient is a 53 year old female with past medical history of HTN and HLD, 

presented with intermittent episodes of epigastric/LUQ pain for 1 month. 

Patient experienced nausea and dizziness yesterday and decided to go to the ED. 

Last New Year's Nadine, patient experienced severe suprapubic, LLQ and LUQ pain 

that she went to the urgent care where ultrasound was done and she was noted to 

have gallstones. Patient followed up with her GI doctor, and started her on 

Hyoscyamine. Today patient still reports LUQ pain. Denies nausea, vomiting, 

fever, chills, headache, dizziness, chest pain, SOB, diarrhea, constipation,

urinary symptoms. 








OBJECTIVE:





 Vital Signs











Temperature  98.1 F   01/31/19 15:36


 


Pulse Rate  67   01/31/19 15:36


 


Respiratory Rate  18   01/31/19 15:36


 


Blood Pressure  145/67   01/31/19 15:36


 


O2 Sat by Pulse Oximetry (%)  100   01/31/19 15:38














GENERAL: The patient is awake, alert, and fully oriented, in no acute distress.


HEAD: Normal with no signs of trauma.


EYES: PERRLA, EOMI, sclera anicteric, conjunctiva clear. 


ENT: Ears normal, nares patent, oropharynx clear without exudates, moist mucous 

membranes.


NECK: Trachea midline, full range of motion, supple. 


LUNGS: Breath sounds equal, clear to auscultation bilaterally.


HEART: Regular rate and rhythm, S1, S2 without murmur, rub or gallop.


ABDOMEN: Soft, +epigastric/LUQ tenderness, nondistended, normoactive bowel 

sounds, no guarding, no 


rebound, no hepatosplenomegaly, no masses.


EXTREMITIES: 2+ pulses, warm, well-perfused, no edema. 


NEUROLOGICAL: Cranial nerves II through XII grossly intact. Normal speech, gait 

not observed.


PSYCH: Normal mood, normal affect.


SKIN: Warm, dry, normal turgor, no rashes or lesions noted














 Laboratory Results - last 24 hr











  01/30/19 01/30/19 01/30/19





  16:37 16:38 17:09


 


WBC  9.4  


 


RBC  4.20  


 


Hgb  11.3  


 


Hct  33.8  


 


MCV  80.4  


 


MCH  27.0  


 


MCHC  33.5  


 


RDW  13.9  


 


Plt Count  292  


 


MPV  8.6  


 


Absolute Neuts (auto)  5.8  


 


Neutrophils %  61.5  


 


Lymphocytes %  27.6  


 


Monocytes %  7.4  


 


Eosinophils %  2.4  


 


Basophils %  1.1  D  


 


Nucleated RBC %  0  


 


Sodium   140 


 


Potassium   4.3 


 


Chloride   106 


 


Carbon Dioxide   25 


 


Anion Gap   8 


 


BUN   25 H 


 


Creatinine   0.8 


 


Creat Clearance w eGFR   > 60 


 


Random Glucose   80 


 


Calcium   8.9 


 


Phosphorus   


 


Magnesium   


 


Total Bilirubin   0.2 


 


AST   24 


 


ALT   24 


 


Alkaline Phosphatase   92 


 


Total Protein   7.9 


 


Albumin   3.8 


 


Triglycerides   


 


Cholesterol   


 


Total LDL Cholesterol   


 


HDL Cholesterol   


 


Lipase   550 H 


 


Urine Color    Dkyellow


 


Urine Appearance    Slcloudy


 


Urine pH    6.0


 


Ur Specific Gravity    1.027


 


Urine Protein    Negative


 


Urine Glucose (UA)    Negative


 


Urine Ketones    Negative


 


Urine Blood    Negative


 


Urine Nitrite    Negative


 


Urine Bilirubin    Negative


 


Urine Urobilinogen    Negative


 


Ur Leukocyte Esterase    1+ H


 


Urine WBC (Auto)    9


 


Urine RBC (Auto)    4


 


Ur Epithelial Cells    Moderate


 


Urine Bacteria    Rare


 


Urine Mucus    Rare


 


Urine HCG, Qual   














  01/30/19 01/31/19 01/31/19





  18:44 04:25 06:30


 


WBC    7.6


 


RBC    3.93


 


Hgb    10.4 L


 


Hct    31.3 L


 


MCV    79.5 L


 


MCH    26.4


 


MCHC    33.2


 


RDW    13.8


 


Plt Count    251


 


MPV    8.5


 


Absolute Neuts (auto)    4.8


 


Neutrophils %    63.9


 


Lymphocytes %    25.3


 


Monocytes %    6.6


 


Eosinophils %    3.9


 


Basophils %    0.3


 


Nucleated RBC %    0


 


Sodium   


 


Potassium   


 


Chloride   


 


Carbon Dioxide   


 


Anion Gap   


 


BUN   


 


Creatinine   


 


Creat Clearance w eGFR   


 


Random Glucose   


 


Calcium   


 


Phosphorus   


 


Magnesium   


 


Total Bilirubin   


 


AST   


 


ALT   


 


Alkaline Phosphatase   


 


Total Protein   


 


Albumin   


 


Triglycerides  134  


 


Cholesterol  242 H  


 


Total LDL Cholesterol  164 H  


 


HDL Cholesterol  51  


 


Lipase   


 


Urine Color   Straw 


 


Urine Appearance   Clear 


 


Urine pH   7.0 


 


Ur Specific Gravity   1.009 L 


 


Urine Protein   Negative 


 


Urine Glucose (UA)   Negative 


 


Urine Ketones   Negative 


 


Urine Blood   Negative 


 


Urine Nitrite   Negative 


 


Urine Bilirubin   Negative 


 


Urine Urobilinogen   Negative 


 


Ur Leukocyte Esterase   Negative 


 


Urine WBC (Auto)   


 


Urine RBC (Auto)   


 


Ur Epithelial Cells   


 


Urine Bacteria   


 


Urine Mucus   


 


Urine HCG, Qual   














  01/31/19 01/31/19





  06:30 11:25


 


WBC  


 


RBC  


 


Hgb  


 


Hct  


 


MCV  


 


MCH  


 


MCHC  


 


RDW  


 


Plt Count  


 


MPV  


 


Absolute Neuts (auto)  


 


Neutrophils %  


 


Lymphocytes %  


 


Monocytes %  


 


Eosinophils %  


 


Basophils %  


 


Nucleated RBC %  


 


Sodium  143 


 


Potassium  4.2 


 


Chloride  110 H 


 


Carbon Dioxide  26 


 


Anion Gap  7 L 


 


BUN  13 


 


Creatinine  0.7 


 


Creat Clearance w eGFR  > 60 


 


Random Glucose  77 


 


Calcium  8.4 L 


 


Phosphorus  3.5 


 


Magnesium  2.2 


 


Total Bilirubin  0.5 


 


AST  16 


 


ALT  22 


 


Alkaline Phosphatase  76 


 


Total Protein  6.7 


 


Albumin  3.2 L 


 


Triglycerides  


 


Cholesterol  


 


Total LDL Cholesterol  


 


HDL Cholesterol  


 


Lipase  253 


 


Urine Color  


 


Urine Appearance  


 


Urine pH  


 


Ur Specific Gravity  


 


Urine Protein  


 


Urine Glucose (UA)  


 


Urine Ketones  


 


Urine Blood  


 


Urine Nitrite  


 


Urine Bilirubin  


 


Urine Urobilinogen  


 


Ur Leukocyte Esterase  


 


Urine WBC (Auto)  


 


Urine RBC (Auto)  


 


Ur Epithelial Cells  


 


Urine Bacteria  


 


Urine Mucus  


 


Urine HCG, Qual   Negative








Active Medications











Generic Name Dose Route Start Last Admin





  Trade Name Freq  PRN Reason Stop Dose Admin


 


Acetaminophen  650 mg  01/31/19 03:15  01/31/19 15:04





  Tylenol -  PO   650 mg





  Q4H PRN   Administration





  PAIN LEVEL 1-5   





     





     





     


 


Atorvastatin Calcium  10 mg  01/31/19 22:00  





  Lipitor -  PO   





  HS PROSPER   





     





     





     





     


 


Enoxaparin Sodium  40 mg  01/31/19 10:00  01/31/19 10:34





  Lovenox -  SQ   Not Given





  DAILY PROSPER   





     





     





     





     


 


Lactated Ringer's  1,000 ml in 1,000 mls @ 75 mls/hr  01/31/19 15:45  





  Lactated Ringers Solution  IV   





  ASDIR PROSPER   





     





     





     





     


 


Nebivolol  5 mg  01/31/19 10:00  01/31/19 15:34





  Bystolic -  PO   5 mg





  DAILY PROSPER   Administration





     





     





     





     











ASSESSMENT/PLAN:


Patient is a 53 year old female with past medical history of HTN and HLD, 

presented with intermittent episodes of epigastric/LUQ pain for 1 month. 





#Abdominal pain


-CT AP: Cholelithiasis. No pancreatitis or acute pathology within abdomen/

pelvis.


-GI (Dr. Willis) consulted. Recommendations appreciated.


-EGD done for further intraluminal evaluation:


-Esophagus, Stomach and duodenal mucosa appeared normal. Biopsy done.


-Avoid NSAIDs for 3 days. 


-Follow-up with bariatric surgeon.


-Advance diet.


-Surgery (Dr. Salazar) consulted.


-Lipase trended down 550 --> 253.





#Hypertension


-Continue home Bystolic 5mg daily.


-will continue to monitor BP.





#Hyperlipidemia


-Continue Pravastatin 40mg daily.


-Lipid panel: , Chol 242, , HDL 51





#FEN


-IV LR @100cc/hr


-Electrolytes wnl, routine bmp monitoring


-Sodium/Cholesterol restricted diet.





#Prophylaxis


-Lovenox 40mg sq daily.





#Disposition


-full code


-med surg





Visit type





- Emergency Visit


Emergency Visit: Yes


ED Registration Date: 01/30/19


Care time: The patient presented to the Emergency Department on the above date 

and was hospitalized for further evaluation of their emergent condition.





- New Patient


This patient is new to me today: Yes


Date on this admission: 01/31/19





- Critical Care


Critical Care patient: No

## 2019-01-31 NOTE — CONSULT
Consult


Consult Specialty:: General Surgery


Referred by:: Tiffanie Frost


Reason for Consultation:: elevated lipase, epigastric and LUQ pain





- History of Present Illness


Chief Complaint: left-sided abdominal pain


History of Present Illness: 





54yo obese F with HTN, HLD, thalassemia, s/p lap band 15 yrs ago with no 

followup in recent years, began having lower abdominal pain about a month ago. 

She initially was diagnosed with ovarian cyst issues, and saw GYN, who did not 

think her pain was related to a GYN source. She reports generally daily BMs, 

though sometimes a little hard to get out, except less in the last 3 days and 

none yesterday. No urinary symptoms. She does report some nausea with the pain, 

which then migrated to mainly the left abdomen, more upper, and also in the 

epigastric area. She then saw GI mid-January, who gave her medicine "to relax 

her intestines," and at first she thought it might be helping, but it really 

didn't. The pain comes and goes without provocation, and a few days ago, it was 

so bad she could barely walk. She went to her PMD, who sent her to urgent care, 

where she was told she had gallstones, and sent to ER here. CT here revealed no 

acute pathology, but did confirm gallstones, with no inflammatory changes 

around gallbladder or pancreas. Lipase was initially elevated, but is normal 

today. WBC is normal. She is afebrile. Surgery was asked to evaluate.





GI saw patient earlier today, and took her for upper endoscopy, which showed no 

apparent abnormalities attributable to the band, normal mucosa, and biopsies 

were sent for H. pylori. She is seen and examined in bed, with family present. 

She reports the pain was present shortly ago, though not as bad as before. She 

sometimes uses Meloxicam at home (~3x weekly) for knee pain. She is getting 

Tylenol here, but is not sure it is working very well. She reports normal 

colonoscopy last year, but has not seen her bariatric surgeon in many years. 

She also states they never were able to fill the band properly, because they 

had trouble finding the port. Her diet is fairly normal and regular, though she 

does say there are some things she can't eat much of because of the band (rice)

. She was restarted on diet after the scope today, and is tolerating solid food.





- History Source


History Provided By: Patient


Limitations to Obtaining History: No Limitations





- Past Medical History


Cardio/Vascular: Yes: HTN, Hyperlipdemia


Gastrointestinal: Yes: Other (morbid obesity)


Hepatobiliary: Yes: Cholelithiasis


...Pregnant: No


Heme/Onc: Yes: Anemia (thalassemia)





- Past Surgical History


Past Surgical History: Yes: Bariatric Surgery (lap band 15 yrs ago at [facility]), 

Upper Endoscopy





- Alcohol/Substance Use


Hx Alcohol Use: No


History of Substance Use: reports: None





- Smoking History


Smoking history: Former smoker (5 pk-yr history)


Have you smoked in the past 12 months: No


If you are a former smoker, when did you quit?: 27 yrs ago





- Social History


Usual Living Arrangement: With Child


ADL: Independent


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


History of Recent Travel: No





Home Medications





- Allergies


Allergies/Adverse Reactions: 


 Allergies











Allergy/AdvReac Type Severity Reaction Status Date / Time


 


No Known Allergies Allergy   Verified 09/01/16 11:05














- Home Medications


Home Medications: 


Ambulatory Orders





Nebivolol [Bystolic -] 5 mg PO DAILY 09/01/16 


Pravastatin Sodium [Pravachol (Nf)] 40 mg PO HS 01/30/19 











Family Disease History





- Family Disease History


Family Disease History: Other: Father (Alive: HTN/CVA), Mother (Alive: HTN), 

Sister (3, healthy), Son (1, healthy), Daughter (1, healthy)


Other Family History: No family history of colorectal cancer or other GI 

malignancy





Review of Systems





- Review of Systems


Constitutional: denies: Chills, Fever


Eyes: denies: Blurred Vision, Recent Change in Vision


HENT: denies: Difficult Swallowing, Throat Pain


Neck: denies: Swollen Glands, Tenderness


Cardiovascular: denies: Chest Pain, Palpitations


Respiratory: denies: Cough, SOB


Gastrointestinal: reports: Abdominal Pain (with hpi), Constipation, Nausea (

with hpi).  denies: Diarrhea, Vomiting


Genitourinary: denies: Burning, Dysuria


Musculoskeletal: reports: Joint Pain (knees).  denies: Back Pain


Integumentary: denies: Change in Color, Rash


Neurological: denies: Dizziness, Headache





Physical Exam


Vital Signs: 


 Vital Signs











Temperature  98.5 F   01/31/19 17:41


 


Pulse Rate  65   01/31/19 17:41


 


Respiratory Rate  18   01/31/19 17:41


 


Blood Pressure  142/69   01/31/19 17:41


 


O2 Sat by Pulse Oximetry (%)  100   01/31/19 15:38











Constitutional: Yes: No Distress, Calm, Obese


Eyes: Yes: Conjunctiva Clear, EOM Intact


HENT: Yes: Atraumatic, Normocephalic


Neck: Yes: Supple, Trachea Midline


Cardiovascular: Yes: Regular Rate and Rhythm


Respiratory: Yes: Regular, CTA Bilaterally


Gastrointestinal: Yes: Normal Bowel Sounds, Soft, Abdomen, Obese, Palpable Mass 

(port palpable beneath scar just above umbilicus), Tenderness (LUQ without 

rebound or guarding, referred from RUQ and epigastric to LUQ; mild LLQ), Other (

healed laparoscopic scars).  No: Tenderness, Rebound


...Rectal Exam: Yes: Deferred


Renal/: No: CVA Tenderness - Left, CVA Tenderness - Right


Musculoskeletal: No: Joint Stiffness, Joint Swelling


Extremities: No: Cool, Cyanosis


Edema: No


Peripheral Pulses WNL: Yes


Integumentary: No: Jaundice, Rash


Neurological: Yes: Alert, Oriented


Psychiatric: Yes: Alert, Oriented


Labs: 


 CBC, BMP





 01/31/19 06:30 





 01/31/19 06:30 





 CMP











Sodium  143 mmol/L (136-145)   01/31/19  06:30    


 


Potassium  4.2 mmol/L (3.5-5.1)   01/31/19  06:30    


 


Chloride  110 mmol/L ()  H  01/31/19  06:30    


 


Carbon Dioxide  26 mmol/L (21-32)   01/31/19  06:30    


 


Anion Gap  7 MMOL/L (8-16)  L  01/31/19  06:30    


 


BUN  13 mg/dL (7-18)   01/31/19  06:30    


 


Creatinine  0.7 mg/dL (0.55-1.3)   01/31/19  06:30    


 


Creat Clearance w eGFR  > 60  (>60)   01/31/19  06:30    


 


Random Glucose  77 mg/dL ()   01/31/19  06:30    


 


Calcium  8.4 mg/dL (8.5-10.1)  L  01/31/19  06:30    


 


Phosphorus  3.5 mg/dL (2.5-4.9)   01/31/19  06:30    


 


Magnesium  2.2 mg/dL (1.8-2.4)   01/31/19  06:30    


 


Total Bilirubin  0.5 mg/dL (0.2-1)   01/31/19  06:30    


 


AST  16 U/L (15-37)   01/31/19  06:30    


 


ALT  22 U/L (13-61)   01/31/19  06:30    


 


Alkaline Phosphatase  76 U/L ()   01/31/19  06:30    


 


Total Protein  6.7 g/dl (6.4-8.2)   01/31/19  06:30    


 


Albumin  3.2 g/dl (3.4-5.0)  L  01/31/19  06:30    


 


Triglycerides  134 mg/dL (0-150)   01/30/19  18:44    


 


Cholesterol  242 mg/dL ()  H  01/30/19  18:44    


 


Total LDL Cholesterol  164 mg/dL (5-100)  H  01/30/19  18:44    


 


HDL Cholesterol  51 mg/dL (40-60)   01/30/19  18:44    


 


Lipase  253 U/L ()   01/31/19  06:30    








lipase down from 550


 Urine Test Results











Urine Color  Straw   01/31/19  04:25    


 


Urine Appearance  Clear   01/31/19  04:25    


 


Urine pH  7.0  (5.0-8.0)   01/31/19  04:25    


 


Ur Specific Gravity  1.009  (1.010-1.035)  L  01/31/19  04:25    


 


Urine Protein  Negative  (NEGATIVE)   01/31/19  04:25    


 


Urine Glucose (UA)  Negative  (NEGATIVE)   01/31/19  04:25    


 


Urine Ketones  Negative  (NEGATIVE)   01/31/19  04:25    


 


Urine Blood  Negative  (NEGATIVE)   01/31/19  04:25    


 


Urine Nitrite  Negative  (NEGATIVE)   01/31/19  04:25    


 


Urine Bilirubin  Negative  (<2.0 mg/dL)   01/31/19  04:25    


 


Ur Leukocyte Esterase  Negative  (NEGATIVE)   01/31/19  04:25    


 


Ur Epithelial Cells  Moderate /HPF (FEW)   01/30/19  17:09    


 


Urine Bacteria  Rare /hpf (NONE SEEN)   01/30/19  17:09    


 


Urine Mucus  Rare   01/30/19  17:09    














Imaging





- Results


Cat Scan: Report Reviewed, Image Reviewed (images reviewed - pancreas without 

inflammatory changes, + gallstones, no obstruction, free air or fluid, no clear 

source for pain identified)


Other: Report Reviewed (EGD report reviewed and pictures seen - no apparent 

abnormality ascribed to lap band, biopsies pending for H. pylori), Image 

Reviewed





Problem List





- Problems


(1) LUQ pain


Assessment/Plan: 


tolerating diet - advised smaller, more frequent meals


mild constipation


would avoid narcotics


no NSAIDS for 3 days per GI


recommended pt to f/u with her bariatric surgeon and/or another of her choice


she may want CD of CT to take with her


f/u H. pylori biopsies





unclear etiology for left sided pain, but not from gallbladder (or pancreas) 

pathology


not tender directly over costal margin or ribs like she is in LUQ


referred from mid and right side - possibly related to adhesions from band? or 

tubing? (which coils on left side of abdomen on CT)


no obstruction, no acute surgical issues identified


will sign off from surgical standpoint





Thank you for the opportunity to participate in the care of this patient.


call with further questions


Code(s): R10.12 - LEFT UPPER QUADRANT PAIN   





(2) Epigastric abdominal pain


Code(s): R10.13 - EPIGASTRIC PAIN   





(3) Hx of laparoscopic gastric banding


Code(s): Z98.84 - BARIATRIC SURGERY STATUS   





(4) Morbid obesity with BMI of 40.0-44.9, adult


Code(s): E66.01 - MORBID (SEVERE) OBESITY DUE TO EXCESS CALORIES; Z68.41 - BODY 

MASS INDEX (BMI) 40.0-44.9, ADULT

## 2019-01-31 NOTE — CON.GI
Consult


Consult Specialty:: GI


Referred by:: Hospitalist Service


Reason for Consultation:: Abdominal pain





- History of Present Illness


Chief Complaint: "I have pain on my left side when I move"


History of Present Illness: 





53F admitted for evaluation of left sided upper abdominal pain.  She states 

that the pain has been occurring since 12/31/18.  It is exacerbated by movement

, radiates from the LUQ to left lower abdomen and is not related to meals.  She 

denies associated vomiting / unintentional weight loss, change in bowel habits, 

melena, rectal bleeding, fevers/chills.  The pain persisted and she felt dizzy 

so went to urgent care.  She states that she was tld of gallstones and was sent 

to the hospital.  Admission CT scan with IV contrast revealed cholelithiasis 

without acute pathology.  She was recently seen bty her gastroenterologist Dr. Haja Jackson in [place] who gave her hyosciamine.  This did not help.  

Pain persists.  She believes that she had an upper endoscopy 5-6 years ago that 

was"OK".  She also states having had a colonoscopy last year that was normal.  

There is no family history of colorectal cancer or other GI malignancy.       





- History Source


History Provided By: Patient, Medical Record





- Past Medical History


Cardio/Vascular: Yes: HTN, Hyperlipdemia


...Pregnant: No


Heme/Onc: Yes: Anemia (Thalassemia)





- Past Surgical History


Additional Surgical History: Lap band





- Alcohol/Substance Use


Hx Alcohol Use: No


History of Substance Use: reports: None





- Smoking History


Smoking history: Never smoked


Have you smoked in the past 12 months: No





- Social History


Usual Living Arrangement: With Child


ADL: Independent


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


Place of Birth: [place]


History of Recent Travel: No





Home Medications





- Allergies


Allergies/Adverse Reactions: 


 Allergies











Allergy/AdvReac Type Severity Reaction Status Date / Time


 


No Known Allergies Allergy   Verified 09/01/16 11:05














- Home Medications


Home Medications: 


Ambulatory Orders





Nebivolol [Bystolic -] 5 mg PO DAILY 09/01/16 


Pravastatin Sodium [Pravachol (Nf)] 40 mg PO HS 01/30/19 











Family Disease History





- Family Disease History


Family Disease History: Other: Father (Alive: HTN/CVA), Mother (Alive: HTN), 

Sister (3, healthy), Son (1, healthy), Daughter (1, healthy)


Other Family History: No family history of colorectal cancer or other GI 

malignancy





Review of Systems





- Review of Systems


Constitutional: denies: Chills, Fever, Unintentional Wgt. Loss


Cardiovascular: denies: Chest Pain, Shortness of Breath


Gastrointestinal: reports: Abdominal Pain, Nausea.  denies: Bloating, 

Constipation, Diarrhea, Dysphagia, Indigestion, Melena, Rectal Bleeding, 

Vomiting


Musculoskeletal: denies: Back Pain





Physical Exam-GI


Vital Signs: 


 Vital Signs











Temperature  97.9 F   01/31/19 07:00


 


Pulse Rate  62   01/31/19 07:00


 


Respiratory Rate  18   01/31/19 07:00


 


Blood Pressure  118/61   01/31/19 07:00


 


O2 Sat by Pulse Oximetry (%)  98   01/31/19 01:42











Constitutional: Yes: Calm


Eyes: No: Sclera Icterus


Cardiovascular: Yes: Regular Rate and Rhythm.  No: Murmur


Respiratory: Yes: CTA Bilaterally


Gastrointestinal Inspection: Yes: Scars (trochar scars with palpable lap band 

port in mid upper abdomen).  No: Distention


...Auscultate: Yes: Normoactive Bowel Sounds


...Palpate: Yes: Tenderness (LUQ>epigastric).  No: Guarding, Hepatomegaly, 

Splenomegaly, Tenderness, Rebound


...Percussion: No: Tympanitic


...Rectal Exam: Yes: Other (Chaperone present: No external lesions, no mases, 

formed light brown stool in rectal vault, guaiac negative.)


Edema: No (No LE edema)


Neurological: Yes: Alert


Labs: 


 CBC, BMP





 01/31/19 06:30 





 01/30/19 16:38 





 Laboratory Tests











  01/30/19





  16:38


 


Lipase  550 H














Imaging





- Results


Cat Scan: Report Reviewed





Problem List





- Problems


(1) Abdominal pain


Assessment/Plan: 


Pain not typical of cholecystitis or pancreatitis and lipase of 550 not 

diagnostic of pancreatitis. Given description of pain, ? if the pain is somatic 

/ musculoskeletal in nature as opposed to visceral. There was mild epigastric 

TTP noted on my exam.


To exclude Lap Band Complication such as lap band erosion into the gastric wall

, along with alternate pathologies such as PUD, gastritis, we discussed upper 

endoscopy for further intraluminal evaluation.  We discussed potential risks of 

the procedure like but not limited to bleeding, perforation requiring surgery 

to repair, infection and sedation medication effects all of which could be 

potentially life threatening.  She has agreed to the procedure


For now:


NPO


IV Hydration


     


Code(s): R10.9 - UNSPECIFIED ABDOMINAL PAIN   


Qualifiers: 


   Abdominal location: generalized   Qualified Code(s): R10.84 - Generalized 

abdominal pain

## 2019-02-01 VITALS — HEART RATE: 70 BPM | DIASTOLIC BLOOD PRESSURE: 67 MMHG | SYSTOLIC BLOOD PRESSURE: 117 MMHG | TEMPERATURE: 98.6 F

## 2019-02-01 LAB
ANION GAP SERPL CALC-SCNC: 4 MMOL/L (ref 8–16)
BUN SERPL-MCNC: 10 MG/DL (ref 7–18)
CALCIUM SERPL-MCNC: 8.4 MG/DL (ref 8.5–10.1)
CHLORIDE SERPL-SCNC: 105 MMOL/L (ref 98–107)
CO2 SERPL-SCNC: 31 MMOL/L (ref 21–32)
CREAT SERPL-MCNC: 0.8 MG/DL (ref 0.55–1.3)
DEPRECATED RDW RBC AUTO: 13.6 % (ref 11.6–15.6)
GLUCOSE SERPL-MCNC: 80 MG/DL (ref 74–106)
HCT VFR BLD CALC: 33.2 % (ref 32.4–45.2)
HGB BLD-MCNC: 11.1 GM/DL (ref 10.7–15.3)
MAGNESIUM SERPL-MCNC: 2.3 MG/DL (ref 1.8–2.4)
MCH RBC QN AUTO: 26.4 PG (ref 25.7–33.7)
MCHC RBC AUTO-ENTMCNC: 33.5 G/DL (ref 32–36)
MCV RBC: 78.9 FL (ref 80–96)
PHOSPHATE SERPL-MCNC: 4.2 MG/DL (ref 2.5–4.9)
PLATELET # BLD AUTO: 276 K/MM3 (ref 134–434)
PMV BLD: 8.6 FL (ref 7.5–11.1)
POTASSIUM SERPLBLD-SCNC: 4.1 MMOL/L (ref 3.5–5.1)
RBC # BLD AUTO: 4.21 M/MM3 (ref 3.6–5.2)
SODIUM SERPL-SCNC: 140 MMOL/L (ref 136–145)
WBC # BLD AUTO: 6.1 K/MM3 (ref 4–10)

## 2019-02-01 RX ADMIN — ACETAMINOPHEN PRN MG: 325 TABLET ORAL at 12:47

## 2019-02-01 RX ADMIN — ENOXAPARIN SODIUM SCH MG: 40 INJECTION SUBCUTANEOUS at 09:19

## 2019-02-01 RX ADMIN — NEBIVOLOL HYDROCHLORIDE SCH MG: 5 TABLET ORAL at 09:19

## 2019-02-01 RX ADMIN — ACETAMINOPHEN PRN MG: 325 TABLET ORAL at 06:22

## 2019-02-01 NOTE — PN
Teaching Attending Note


Name of Resident: Neeta Maria





ATTENDING PHYSICIAN STATEMENT





I saw and evaluated the patient.


I reviewed the resident's note and discussed the case with the resident.


I agree with the resident's findings and plan as documented.








SUBJECTIVE:





Patient is feeling better with no acute distress, no shortness of breath.m


OBJECTIVE:


 Vital Signs











Temperature  98.3 F   02/01/19 05:00


 


Pulse Rate  65   02/01/19 05:00


 


Respiratory Rate  18   02/01/19 05:00


 


Blood Pressure  119/65   02/01/19 05:00


 


O2 Sat by Pulse Oximetry (%)  100   01/31/19 23:00








GENERAL: The patient is awake, alert, and fully oriented, in no acute distress.


HEAD: Normal with no signs of trauma.


EYES: PERRLA, EOMI, sclera anicteric, conjunctiva clear. 


ENT: Ears normal,  oropharynx clear without exudates, moist mucous membranes.


NECK: Trachea midline, full range of motion, supple. 


LUNGS: Breath sounds equal, clear to auscultation bilaterally.


HEART: Regular rate and rhythm, S1, S2 without murmur, rub or gallop.


ABDOMEN: Soft, LUQ tenderness, nondistended, normoactive bowel sounds, no 

masses appreciated.


EXTREMITIES: 2+ pulses, warm, well-perfused, no edema. 


NEUROLOGICAL: Cranial nerves II through XII grossly intact. Normal speech, gait 

not observed.


PSYCH: Normal mood, normal affect.


SKIN: Warm, dry, normal turgor, no rashes or lesions noted


 CBCD











WBC  6.1 K/mm3 (4.0-10.0)   02/01/19  06:00    


 


RBC  4.21 M/mm3 (3.60-5.2)   02/01/19  06:00    


 


Hgb  11.1 GM/dL (10.7-15.3)   02/01/19  06:00    


 


Hct  33.2 % (32.4-45.2)   02/01/19  06:00    


 


MCV  78.9 fl (80-96)  L  02/01/19  06:00    


 


MCHC  33.5 g/dl (32.0-36.0)   02/01/19  06:00    


 


RDW  13.6 % (11.6-15.6)   02/01/19  06:00    


 


Plt Count  276 K/MM3 (134-434)   02/01/19  06:00    


 


MPV  8.6 fl (7.5-11.1)   02/01/19  06:00    








 CMP











Sodium  140 mmol/L (136-145)   02/01/19  06:00    


 


Potassium  4.1 mmol/L (3.5-5.1)   02/01/19  06:00    


 


Chloride  105 mmol/L ()   02/01/19  06:00    


 


Carbon Dioxide  31 mmol/L (21-32)   02/01/19  06:00    


 


Anion Gap  4 MMOL/L (8-16)  L  02/01/19  06:00    


 


BUN  10 mg/dL (7-18)   02/01/19  06:00    


 


Creatinine  0.8 mg/dL (0.55-1.3)   02/01/19  06:00    


 


Creat Clearance w eGFR  > 60  (>60)   02/01/19  06:00    


 


Random Glucose  80 mg/dL ()   02/01/19  06:00    


 


Calcium  8.4 mg/dL (8.5-10.1)  L  02/01/19  06:00    


 


Total Bilirubin  0.5 mg/dL (0.2-1)   01/31/19  06:30    


 


AST  16 U/L (15-37)   01/31/19  06:30    


 


ALT  22 U/L (13-61)   01/31/19  06:30    


 


Alkaline Phosphatase  76 U/L ()   01/31/19  06:30    


 


Total Protein  6.7 g/dl (6.4-8.2)   01/31/19  06:30    


 


Albumin  3.2 g/dl (3.4-5.0)  L  01/31/19  06:30    








 Current Medications











Generic Name Dose Route Start Last Admin





  Trade Name Freq  PRN Reason Stop Dose Admin


 


Acetaminophen  650 mg  01/31/19 15:49  02/01/19 06:22





  Tylenol -  PO   650 mg





  Q6H PRN   Administration





  PAIN OR FEVER   





     





     





     


 


Atorvastatin Calcium  10 mg  01/31/19 22:00  01/31/19 22:55





  Lipitor -  PO   10 mg





  HS PROSPER   Administration





     





     





     





     


 


Enoxaparin Sodium  40 mg  01/31/19 10:00  01/31/19 10:34





  Lovenox -  SQ   Not Given





  DAILY PROSPER   





     





     





     





     


 


Lactated Ringer's  1,000 ml in 1,000 mls @ 100 mls/hr  01/31/19 16:38  01/31/19 

22:54





  Lactated Ringers Solution  IV   100 mls/hr





  ASDIR PROSPER   Administration





     





     





     





     


 


Nebivolol  5 mg  01/31/19 10:00  01/31/19 15:34





  Bystolic -  PO   5 mg





  DAILY PROSPER   Administration





     





     





     





     








 Home Medications











 Medication  Instructions  Recorded


 


Nebivolol [Bystolic -] 5 mg PO DAILY 09/01/16


 


Pravastatin Sodium [Pravachol -] 40 mg PO HS 01/30/19


 


Acetaminophen [Tylenol .Regular 650 mg PO Q6H PRN #0 tablet 02/01/19





Strength -]  














CT AP: Cholelithiasis. No pancreatitis or acute pathology within abdomen/pelvis.





ASSESSMENT AND PLAN:


Patient is a 53 year old female with PMHx of HTN and HLD, presented with 

intermittent episodes of epigastric/LUQ pain x 1 month. 





#Abdominal pain: s/p EGD done for further intraluminal evaluation, Esophagus, 

Stomach and duodenal mucosa appeared normal. Biopsy (h.pylori is pending).


 GI (Dr. Willis) and surgical consult( Dr. Salazar)  Recommendations 

appreciated. Avoid NSAIDs. Lipase trended down 550 --> 253.


Patient needs to follow up with her Bariatric MD for further care for possible 

adhesions. Discussed with the patient in detail. 





#Hypertension continue  Bystolic 5mg daily. monitor BP





#Hyperlipidemia: Lipitor per hospital formulary instead of  Pravastatin 40mg 

daily.





will discharge the patient home.

## 2019-02-01 NOTE — DS
Physical Exam: 


SUBJECTIVE: Patient seen and examined at bedside this morning. No acute events 

overnight. Patient still reports LUQ pain, unsure what's aggravating it, 

reports minimal improvement with TYlenol. Denies fever, chills, nausea, vomiting

, headache, dizziness, chest pain, SOB, diarrhea, urinary symptoms.








OBJECTIVE:





 Vital Signs











Temperature  98.8 F   02/01/19 09:00


 


Pulse Rate  65   02/01/19 09:00


 


Respiratory Rate  18   02/01/19 09:00


 


Blood Pressure  124/72   02/01/19 09:00


 


O2 Sat by Pulse Oximetry (%)  100   01/31/19 23:00











PHYSICAL EXAM





GENERAL: The patient is awake, alert, and fully oriented, in no acute distress.


HEAD: Normal with no signs of trauma.


EYES: PERRLA, EOMI, sclera anicteric, conjunctiva clear. 


ENT: Ears normal, nares patent, oropharynx clear without exudates, moist mucous 

membranes.


NECK: Trachea midline, full range of motion, supple. 


LUNGS: Breath sounds equal, clear to auscultation bilaterally.


HEART: Regular rate and rhythm, S1, S2 without murmur, rub or gallop.


ABDOMEN: Soft, +epigastric/LUQ tenderness, nondistended, normoactive bowel 

sounds, no guarding, no 


rebound, no hepatosplenomegaly, no masses.


EXTREMITIES: 2+ pulses, warm, well-perfused, no edema. 


NEUROLOGICAL: Cranial nerves II through XII grossly intact. Normal speech, gait 

not observed.


PSYCH: Normal mood, normal affect.


SKIN: Warm, dry, normal turgor, no rashes or lesions noted





LABS


 Laboratory Results - last 24 hr











  02/01/19 02/01/19





  06:00 06:00


 


WBC  6.1 


 


RBC  4.21 


 


Hgb  11.1 


 


Hct  33.2 


 


MCV  78.9 L 


 


MCH  26.4 


 


MCHC  33.5 


 


RDW  13.6 


 


Plt Count  276 


 


MPV  8.6 


 


Sodium   140


 


Potassium   4.1


 


Chloride   105


 


Carbon Dioxide   31


 


Anion Gap   4 L


 


BUN   10


 


Creatinine   0.8


 


Creat Clearance w eGFR   > 60


 


Random Glucose   80


 


Calcium   8.4 L


 


Phosphorus   4.2


 


Magnesium   2.3











-CT AP: Cholelithiasis. No pancreatitis or acute pathology within abdomen/

pelvis.





HOSPITAL COURSE:





Date of Admission:01/30/19





Date of Discharge: 02/01/19





Patient is a 53 year old female with past medical history of HTN and HLD, 

presented with intermittent episodes of epigastric/LUQ pain for 1 month. GI and 

surgery were consulted. CT scan showed cholelithiasis and no other acute 

pathology. EGD was done where the esophagus, stomach and duodenal mucosa 

appeared normal. Biopsy done. Patient was given Tylenol for the pain. She was 

discharged with instructions to follow up with her PCP, GI doctor and bariatric 

surgeon.





Minutes to complete discharge: 36





Discharge Summary


Reason For Visit: SYMPTOMATIC CHOLEITHIASIS


Current Active Problems





Cholelithiasis (Acute)


Epigastric abdominal pain (Acute)


Hx of laparoscopic gastric banding (Acute)


LUQ pain (Acute)


Morbid obesity with BMI of 40.0-44.9, adult (Acute)








Condition: Stable





- Instructions


Diet, Activity, Other Instructions: 


Your visit


You were admitted to the hospital because you had belly pain.


You were seen by the gastroenterologist and you had endoscopy was done which 

was negative of any concerns.


Biopsy was done. Please follow-up with the gastroenterologist to discuss the 

results.





Care 


-Avoid NSAIDs such as ibuprofen, celebrex for 3 days.


-Have a low fat diet. Take smaller, more frequent meals.


-Drink plenty of water.





Medications 


Continue your home medications. 


You may take Tylenol 650mg every 6 hours as needed for pain. 





Follow-up


-Follow-up with your primary care doctor (Dr. Cowart) within 1 week.


-Follow-up with your gastroenterologist (Dr. Jackson) within 2 weeks to 

discuss biopsy results. 


-Please follow-up with your bariatric surgeon within 1 week. 





Additional info


Call 911 or go to the ED if with any worsening fever, chills, headache, 

dizziness, nausea, vomiting, chest pain, shortness of breath, abdominal pain, 

diarrhea, bloody stools or any new concerns noted. 


Referrals: 


Lexis Cowart [Primary Care Provider] - 1 Week


Everardo Willis DO [Staff Physician] - 


Haja Jackson MD [Non Staff, Medical] - 


Disposition: HOME





- Home Medications


Comprehensive Discharge Medication List: 


Ambulatory Orders





Nebivolol [Bystolic -] 5 mg PO DAILY 09/01/16 


Pravastatin Sodium [Pravachol -] 40 mg PO HS 01/30/19 


Acetaminophen [Tylenol .Regular Strength -] 650 mg PO Q6H PRN #0 tablet 02/01/ 19 


Acetaminophen [Tylenol .Regular Strength -] 650 mg PO Q6H PRN #15 tablet 02/01/ 19 


Famotidine [Pepcid] 20 mg PO DAILY #14 tablet 02/01/19 








This patient is new to me today: No


Emergency Visit: Yes


ED Registration Date: 01/30/19


Care time: The patient presented to the Emergency Department on the above date 

and was hospitalized for further evaluation of their emergent condition.


Critical Care patient: No





- Discharge Referral


Referred to [facility] No

## 2019-02-01 NOTE — PN
GI Progress Note


Subjective: 





No acute events


Had some abdominal cramps this m orning that improved after BM. Did not have BM 

yesterday after EGD.


Left side positional pain persists





- Objective


Vital Signs: 


 Vital Signs











Temperature  98.3 F   02/01/19 05:00


 


Pulse Rate  65   02/01/19 05:00


 


Respiratory Rate  18   02/01/19 05:00


 


Blood Pressure  119/65   02/01/19 05:00


 


O2 Sat by Pulse Oximetry (%)  100   01/31/19 23:00











Constitutional: Calm


Eyes: Yes: Sclera Icterus


Cardiovascular: Yes: Regular Rate and Rhythm


Respiratory: Yes: CTA Bilaterally


Gastrointestinal Inspection: No: Distention


...Auscultate: Yes: Normoactive Bowel Sounds


...Palpate: Yes: Tenderness (TTP left upper flank, along left lower ribs)


...Percussion: No: Tympanitic


Edema: No (No LE edema)


Neurological: Yes: Alert


Labs: 


 CBC, BMP





 02/01/19 06:00 





 02/01/19 06:00 











Problem List





- Problems


(1) Abdominal pain


Assessment/Plan: 


CT and EGD findings do not explain positional left sided pain. ? somatic in 

etiology, ? if related to adhesions from lap band


- Ordered Lidoderm patch and evaluation for alternate etiology of somatic pain 

per primary team


- Outpatient follow-up with Bariatric surgeon


- F/U EGD path


- F/U with her gastroenterologist Dr. Haja Jackson as outpatient.  


Code(s): R10.9 - UNSPECIFIED ABDOMINAL PAIN   


Qualifiers: 


   Abdominal location: generalized   Qualified Code(s): R10.84 - Generalized 

abdominal pain

## 2019-02-01 NOTE — PN
GI Progress Note





- Objective


Vital Signs: 


 Vital Signs











Temperature  98.3 F   02/01/19 05:00


 


Pulse Rate  65   02/01/19 05:00


 


Respiratory Rate  18   02/01/19 05:00


 


Blood Pressure  119/65   02/01/19 05:00


 


O2 Sat by Pulse Oximetry (%)  100   01/31/19 23:00











Labs: 


 CBC, BMP





 02/01/19 06:00 





 02/01/19 06:00 











Problem List





- Problems


(1) Abdominal pain


Code(s): R10.9 - UNSPECIFIED ABDOMINAL PAIN   


Qualifiers: 


   Abdominal location: generalized   Qualified Code(s): R10.84 - Generalized 

abdominal pain

## 2019-02-04 NOTE — PATH
Surgical Pathology Report



Patient Name:  RHODA CHRISTIAN

Accession #:  

Med. Rec. #:  [id number]                                                        

   /Age/Gender:  1965 (Age: 53) / F

Account:  [id number]                                                          

             Location: [facility] MED/SURG

Taken:  2019

Received:  2019

Reported:  2019

Physicians:  DAYNE Hernandes M.D.

  



Specimen(s) Received

 BX ANTRUM AND BODY 





Clinical History

Abdominal pain

Postoperative diagnosis: Lap band anatomy, abdominal pain







Final Diagnosis

ANTRUM AND BODY, BIOPSY:

GASTRIC MUCOSA WITH MILD CHRONIC GASTRITIS.

IMMUNOSTAIN FOR H. PYLORI IS NEGATIVE.

NEGATIVE FOR INTESTINAL METAPLASIA.





***Electronically Signed***

Jaden Wilson M.D.





Gross Description

Received in formalin labeled "biopsy antrum and body," are 3 tan soft tissue

fragments ranging from 0.3-0.7 cm in greatest dimension. The specimens are

submitted in toto in one cassette.